# Patient Record
Sex: MALE | Race: WHITE | Employment: FULL TIME | URBAN - METROPOLITAN AREA
[De-identification: names, ages, dates, MRNs, and addresses within clinical notes are randomized per-mention and may not be internally consistent; named-entity substitution may affect disease eponyms.]

---

## 2023-04-11 PROBLEM — L91.8 SKIN TAGS, MULTIPLE ACQUIRED: Status: ACTIVE | Noted: 2023-04-11

## 2023-04-11 PROBLEM — L30.9 ECZEMA: Status: ACTIVE | Noted: 2023-04-11

## 2023-04-11 PROBLEM — J98.09 TRACHEAL/BRONCHIAL DISEASE: Status: ACTIVE | Noted: 2023-04-11

## 2023-04-11 PROBLEM — M54.31 SCIATICA OF RIGHT SIDE: Status: ACTIVE | Noted: 2023-04-11

## 2023-04-11 PROBLEM — S90.129A CONTUSION, TOE: Status: ACTIVE | Noted: 2023-04-11

## 2023-04-11 PROBLEM — E78.2 MIXED HYPERLIPIDEMIA: Status: ACTIVE | Noted: 2023-04-11

## 2023-04-11 PROBLEM — L98.9 LESION OF SKIN OF NOSE: Status: ACTIVE | Noted: 2023-04-11

## 2023-04-11 PROBLEM — R93.89 ABNORMAL CT OF THE CHEST: Status: ACTIVE | Noted: 2023-04-11

## 2023-04-11 PROBLEM — J39.8 TRACHEAL/BRONCHIAL DISEASE: Status: ACTIVE | Noted: 2023-04-11

## 2023-04-11 PROBLEM — G62.9 PERIPHERAL NEUROPATHY: Status: ACTIVE | Noted: 2023-04-11

## 2023-04-11 PROBLEM — R93.1 AGATSTON CORONARY ARTERY CALCIUM SCORE LESS THAN 100: Status: ACTIVE | Noted: 2023-04-11

## 2023-04-11 PROBLEM — R74.8 ELEVATED ALKALINE PHOSPHATASE LEVEL: Status: ACTIVE | Noted: 2023-04-11

## 2023-04-11 PROBLEM — Z98.890 H/O AORTIC ARCH REPAIR: Status: ACTIVE | Noted: 2023-04-11

## 2023-04-11 PROBLEM — M54.17 LUMBOSACRAL NEURITIS: Status: ACTIVE | Noted: 2023-04-11

## 2023-04-11 PROBLEM — R35.0 URINARY FREQUENCY: Status: ACTIVE | Noted: 2023-04-11

## 2023-04-12 RX ORDER — MELOXICAM 15 MG/1
1 TABLET ORAL DAILY
COMMUNITY
Start: 2022-01-24 | End: 2023-04-13 | Stop reason: SDUPTHER

## 2023-04-12 RX ORDER — GLUCOSAMINE/CHONDROITIN/C/MANG 500-400 MG
CAPSULE ORAL
COMMUNITY
End: 2023-04-13 | Stop reason: WASHOUT

## 2023-04-12 RX ORDER — ASPIRIN 81 MG/1
1 TABLET ORAL DAILY
COMMUNITY

## 2023-04-12 RX ORDER — POLYETHYLENE GLYCOL 3350, SODIUM CHLORIDE, SODIUM BICARBONATE, POTASSIUM CHLORIDE 420; 11.2; 5.72; 1.48 G/4L; G/4L; G/4L; G/4L
POWDER, FOR SOLUTION ORAL
COMMUNITY
Start: 2023-01-10 | End: 2023-04-13 | Stop reason: WASHOUT

## 2023-04-12 RX ORDER — MULTIVIT-MIN/FA/LYCOPEN/LUTEIN .4-300-25
TABLET ORAL
COMMUNITY

## 2023-04-12 RX ORDER — AMOXICILLIN 500 MG/1
500 CAPSULE ORAL EVERY 8 HOURS
COMMUNITY
Start: 2023-01-10 | End: 2023-04-13 | Stop reason: WASHOUT

## 2023-04-12 RX ORDER — ATORVASTATIN CALCIUM 20 MG/1
1 TABLET, FILM COATED ORAL DAILY
COMMUNITY
Start: 2020-10-08 | End: 2023-04-13 | Stop reason: SDUPTHER

## 2023-04-12 RX ORDER — MULTIVITAMIN/IRON/FOLIC ACID 18MG-0.4MG
TABLET ORAL
COMMUNITY
Start: 2020-09-16

## 2023-04-12 RX ORDER — COLLAGENASE CLOSTRIDIUM HIST.
POWDER (EA) MISCELLANEOUS
COMMUNITY
Start: 2020-09-16

## 2023-04-12 RX ORDER — BETAMETHASONE DIPROPIONATE 0.5 MG/G
CREAM TOPICAL 2 TIMES DAILY
COMMUNITY
Start: 2020-09-16

## 2023-04-13 ENCOUNTER — OFFICE VISIT (OUTPATIENT)
Dept: PRIMARY CARE | Facility: CLINIC | Age: 58
End: 2023-04-13
Payer: COMMERCIAL

## 2023-04-13 VITALS
RESPIRATION RATE: 22 BRPM | WEIGHT: 240 LBS | BODY MASS INDEX: 31.66 KG/M2 | TEMPERATURE: 97.5 F | HEART RATE: 81 BPM | DIASTOLIC BLOOD PRESSURE: 77 MMHG | SYSTOLIC BLOOD PRESSURE: 116 MMHG

## 2023-04-13 DIAGNOSIS — R97.20 ELEVATED PSA: Primary | ICD-10-CM

## 2023-04-13 DIAGNOSIS — G60.9 IDIOPATHIC PERIPHERAL NEUROPATHY: ICD-10-CM

## 2023-04-13 DIAGNOSIS — R73.9 HYPERGLYCEMIA: ICD-10-CM

## 2023-04-13 DIAGNOSIS — L98.9 LESION OF SKIN OF NOSE: ICD-10-CM

## 2023-04-13 DIAGNOSIS — M54.17 LUMBOSACRAL NEURITIS: ICD-10-CM

## 2023-04-13 DIAGNOSIS — E78.2 MIXED HYPERLIPIDEMIA: ICD-10-CM

## 2023-04-13 PROBLEM — S90.129A CONTUSION, TOE: Status: RESOLVED | Noted: 2023-04-11 | Resolved: 2023-04-13

## 2023-04-13 PROCEDURE — 83036 HEMOGLOBIN GLYCOSYLATED A1C: CPT

## 2023-04-13 PROCEDURE — 84153 ASSAY OF PSA TOTAL: CPT

## 2023-04-13 PROCEDURE — 99214 OFFICE O/P EST MOD 30 MIN: CPT | Performed by: INTERNAL MEDICINE

## 2023-04-13 PROCEDURE — 36415 COLL VENOUS BLD VENIPUNCTURE: CPT | Performed by: INTERNAL MEDICINE

## 2023-04-13 PROCEDURE — 1036F TOBACCO NON-USER: CPT | Performed by: INTERNAL MEDICINE

## 2023-04-13 RX ORDER — ATORVASTATIN CALCIUM 20 MG/1
20 TABLET, FILM COATED ORAL DAILY
Qty: 90 TABLET | Refills: 1 | Status: SHIPPED | OUTPATIENT
Start: 2023-04-13 | End: 2023-10-09 | Stop reason: SDUPTHER

## 2023-04-13 RX ORDER — MELOXICAM 15 MG/1
15 TABLET ORAL DAILY
Qty: 90 TABLET | Refills: 1 | Status: SHIPPED | OUTPATIENT
Start: 2023-04-13 | End: 2023-10-09 | Stop reason: SDUPTHER

## 2023-04-13 ASSESSMENT — ENCOUNTER SYMPTOMS
COUGH: 0
NAUSEA: 0
CONSTIPATION: 0
FREQUENCY: 0
APPETITE CHANGE: 0
DYSURIA: 0
DIFFICULTY URINATING: 0
FEVER: 0
ANAL BLEEDING: 0
SHORTNESS OF BREATH: 0
ACTIVITY CHANGE: 0
WHEEZING: 0
CHILLS: 0
CHEST TIGHTNESS: 0
PALPITATIONS: 0
ABDOMINAL PAIN: 0
UNEXPECTED WEIGHT CHANGE: 0
VOMITING: 0
DIARRHEA: 0
FATIGUE: 0
NUMBNESS: 1

## 2023-04-13 NOTE — ASSESSMENT & PLAN NOTE
Saw derm and had biopsy-waiting on results  Concerned it is basal cell ca  Will likely have mohs if cancer noted  Follow up with derm

## 2023-04-13 NOTE — PROGRESS NOTES
Subjective   Patient ID: Kyler Cisneros is a 57 y.o. male who presents for routine 6 month follow up.    HPI    Pt here in follow up.  He saw dermatology (Dr. Valente) for his nose lesion.  They are waiting on pathology but he thinks it was basal cell cancer.  If this is positive he will need a Mohs procedure.      He had colonoscopy in 1/2023 that showed numerous polyps (10)-he will have to repeat in 3 years.  He also had diverticulosis.  He is on probiotic that has helped the diarrhea he was once having.      He continues to have numbness of of the bottom of his feet and coldness to the feet.      Review of Systems   Constitutional:  Negative for activity change, appetite change, chills, fatigue, fever and unexpected weight change.   Respiratory:  Negative for cough, chest tightness, shortness of breath and wheezing.    Cardiovascular:  Negative for chest pain, palpitations and leg swelling.   Gastrointestinal:  Negative for abdominal pain, anal bleeding, constipation, diarrhea, nausea and vomiting.   Genitourinary:  Negative for decreased urine volume, difficulty urinating, dysuria, frequency and urgency.   Neurological:  Positive for numbness (feet).       Objective   /77   Pulse 81   Temp 36.4 °C (97.5 °F)   Resp 22   Wt 109 kg (240 lb)   BMI 31.66 kg/m²      Physical Exam  Cardiovascular:      Rate and Rhythm: Normal rate and regular rhythm.      Pulses: Normal pulses.      Heart sounds: Normal heart sounds.   Pulmonary:      Breath sounds: Normal breath sounds.   Abdominal:      General: Bowel sounds are normal.   Musculoskeletal:         General: No swelling, tenderness, deformity or signs of injury. Normal range of motion.      Right lower leg: No edema.      Left lower leg: No edema.   Skin:     General: Skin is warm and dry.      Findings: No erythema.   Neurological:      Mental Status: He is alert.      Sensory: Sensory deficit (minor decrease in sensation ball of both feet) present.       Coordination: Coordination normal.      Deep Tendon Reflexes: Reflexes normal.   Psychiatric:         Mood and Affect: Mood normal.         Assessment/Plan   Problem List Items Addressed This Visit          Nervous    Lumbosacral neuritis    Relevant Medications    meloxicam (Mobic) 15 mg tablet    Peripheral neuropathy     Discussed symptoms pt having likely from back and nerve damage  Can do NCT/EMG he will think on this  Did have smoking history so will consider PAD but pulses good on exam  Will check a1c today but sugars have been ok in past  Monitor for now         Relevant Orders    Follow Up In Primary Care       Genitourinary    Elevated PSA - Primary     Was just over normal limit in 10/2022  Will repeat it today  No urinary symptoms at this time          Relevant Orders    Prostate Specific Antigen       Other    Lesion of skin of nose     Saw derm and had biopsy-waiting on results  Concerned it is basal cell ca  Will likely have mohs if cancer noted  Follow up with derm          Mixed hyperlipidemia     On statin therapy  Lipids checked previous and LDL <100         Relevant Medications    atorvastatin (Lipitor) 20 mg tablet    Other Relevant Orders    Follow Up In Primary Care     Other Visit Diagnoses       Hyperglycemia        Relevant Orders    Hemoglobin A1C

## 2023-04-13 NOTE — PATIENT INSTRUCTIONS
Continue medications as directed-refill provided  We did repeat blood work today and will call with results  The numbness of the feet/cold is likely from nerve damage from low back; we can do NCT/EMG in future if you want  Continue to adhere to healthy diet and exercise regularly  Follow up in 6 month for full physical

## 2023-04-13 NOTE — ASSESSMENT & PLAN NOTE
Discussed symptoms pt having likely from back and nerve damage  Can do NCT/EMG he will think on this  Did have smoking history so will consider PAD but pulses good on exam  Will check a1c today but sugars have been ok in past  Monitor for now

## 2023-04-14 ENCOUNTER — PATIENT MESSAGE (OUTPATIENT)
Dept: PRIMARY CARE | Facility: CLINIC | Age: 58
End: 2023-04-14
Payer: COMMERCIAL

## 2023-04-14 DIAGNOSIS — R97.20 ELEVATED PSA: Primary | ICD-10-CM

## 2023-04-14 LAB
ESTIMATED AVERAGE GLUCOSE FOR HBA1C: 140 MG/DL
HEMOGLOBIN A1C/HEMOGLOBIN TOTAL IN BLOOD: 6.5 %
PROSTATE SPECIFIC AG (NG/ML) IN SER/PLAS: 5.05 NG/ML (ref 0–4)

## 2023-09-13 PROBLEM — S90.129A CONTUSION, TOE: Status: ACTIVE | Noted: 2023-09-13

## 2023-09-13 PROBLEM — R19.5 LOOSE STOOLS: Status: ACTIVE | Noted: 2023-09-13

## 2023-09-13 PROBLEM — R14.0 GASSINESS: Status: ACTIVE | Noted: 2023-09-13

## 2023-09-13 PROBLEM — F17.210 CIGARETTE NICOTINE DEPENDENCE: Status: ACTIVE | Noted: 2023-09-13

## 2023-09-13 RX ORDER — POLYETHYLENE GLYCOL 3350, SODIUM CHLORIDE, SODIUM BICARBONATE, POTASSIUM CHLORIDE 420; 11.2; 5.72; 1.48 G/4L; G/4L; G/4L; G/4L
POWDER, FOR SOLUTION ORAL
COMMUNITY
Start: 2023-01-10 | End: 2023-10-09 | Stop reason: WASHOUT

## 2023-09-13 RX ORDER — ATORVASTATIN CALCIUM 20 MG/1
20 TABLET, FILM COATED ORAL DAILY
COMMUNITY
Start: 2020-10-08 | End: 2023-10-09 | Stop reason: WASHOUT

## 2023-09-13 RX ORDER — TRIAMCINOLONE ACETONIDE 1 MG/G
CREAM TOPICAL
COMMUNITY
Start: 2023-08-09

## 2023-09-13 RX ORDER — BETAMETHASONE DIPROPIONATE 0.5 MG/G
1 CREAM TOPICAL 2 TIMES DAILY
COMMUNITY
Start: 2020-09-16 | End: 2023-10-09 | Stop reason: WASHOUT

## 2023-10-09 ENCOUNTER — OFFICE VISIT (OUTPATIENT)
Dept: PRIMARY CARE | Facility: CLINIC | Age: 58
End: 2023-10-09
Payer: COMMERCIAL

## 2023-10-09 VITALS
HEART RATE: 80 BPM | SYSTOLIC BLOOD PRESSURE: 116 MMHG | BODY MASS INDEX: 28.88 KG/M2 | HEIGHT: 73 IN | RESPIRATION RATE: 18 BRPM | WEIGHT: 217.9 LBS | DIASTOLIC BLOOD PRESSURE: 72 MMHG

## 2023-10-09 DIAGNOSIS — R97.20 ELEVATED PSA: ICD-10-CM

## 2023-10-09 DIAGNOSIS — Z13.29 SCREENING FOR THYROID DISORDER: ICD-10-CM

## 2023-10-09 DIAGNOSIS — E78.2 MIXED HYPERLIPIDEMIA: ICD-10-CM

## 2023-10-09 DIAGNOSIS — G60.9 IDIOPATHIC PERIPHERAL NEUROPATHY: ICD-10-CM

## 2023-10-09 DIAGNOSIS — E11.9 CONTROLLED TYPE 2 DIABETES MELLITUS WITHOUT COMPLICATION, WITHOUT LONG-TERM CURRENT USE OF INSULIN (MULTI): ICD-10-CM

## 2023-10-09 DIAGNOSIS — Z23 NEED FOR INFLUENZA VACCINATION: ICD-10-CM

## 2023-10-09 DIAGNOSIS — L30.9 ECZEMA, UNSPECIFIED TYPE: ICD-10-CM

## 2023-10-09 DIAGNOSIS — Z23 NEED FOR SHINGLES VACCINE: ICD-10-CM

## 2023-10-09 DIAGNOSIS — M54.17 LUMBOSACRAL NEURITIS: ICD-10-CM

## 2023-10-09 DIAGNOSIS — Z00.00 ROUTINE GENERAL MEDICAL EXAMINATION AT A HEALTH CARE FACILITY: Primary | ICD-10-CM

## 2023-10-09 PROBLEM — R14.0 GASSINESS: Status: RESOLVED | Noted: 2023-09-13 | Resolved: 2023-10-09

## 2023-10-09 PROBLEM — J98.09 TRACHEAL/BRONCHIAL DISEASE: Status: RESOLVED | Noted: 2023-04-11 | Resolved: 2023-10-09

## 2023-10-09 PROBLEM — L98.9 LESION OF SKIN OF NOSE: Status: RESOLVED | Noted: 2023-04-11 | Resolved: 2023-10-09

## 2023-10-09 PROBLEM — R35.0 URINARY FREQUENCY: Status: RESOLVED | Noted: 2023-04-11 | Resolved: 2023-10-09

## 2023-10-09 PROBLEM — S90.129A CONTUSION, TOE: Status: RESOLVED | Noted: 2023-09-13 | Resolved: 2023-10-09

## 2023-10-09 PROBLEM — J39.8 TRACHEAL/BRONCHIAL DISEASE: Status: RESOLVED | Noted: 2023-04-11 | Resolved: 2023-10-09

## 2023-10-09 PROBLEM — F17.210 CIGARETTE NICOTINE DEPENDENCE: Status: RESOLVED | Noted: 2023-09-13 | Resolved: 2023-10-09

## 2023-10-09 PROBLEM — R19.5 LOOSE STOOLS: Status: RESOLVED | Noted: 2023-09-13 | Resolved: 2023-10-09

## 2023-10-09 PROCEDURE — 84443 ASSAY THYROID STIM HORMONE: CPT

## 2023-10-09 PROCEDURE — 85027 COMPLETE CBC AUTOMATED: CPT

## 2023-10-09 PROCEDURE — 83036 HEMOGLOBIN GLYCOSYLATED A1C: CPT

## 2023-10-09 PROCEDURE — 3061F NEG MICROALBUMINURIA REV: CPT | Performed by: INTERNAL MEDICINE

## 2023-10-09 PROCEDURE — 3074F SYST BP LT 130 MM HG: CPT | Performed by: INTERNAL MEDICINE

## 2023-10-09 PROCEDURE — 36415 COLL VENOUS BLD VENIPUNCTURE: CPT

## 2023-10-09 PROCEDURE — 3044F HG A1C LEVEL LT 7.0%: CPT | Performed by: INTERNAL MEDICINE

## 2023-10-09 PROCEDURE — 3048F LDL-C <100 MG/DL: CPT | Performed by: INTERNAL MEDICINE

## 2023-10-09 PROCEDURE — 1036F TOBACCO NON-USER: CPT | Performed by: INTERNAL MEDICINE

## 2023-10-09 PROCEDURE — 90471 IMMUNIZATION ADMIN: CPT | Performed by: INTERNAL MEDICINE

## 2023-10-09 PROCEDURE — 3078F DIAST BP <80 MM HG: CPT | Performed by: INTERNAL MEDICINE

## 2023-10-09 PROCEDURE — 81001 URINALYSIS AUTO W/SCOPE: CPT

## 2023-10-09 PROCEDURE — 90750 HZV VACC RECOMBINANT IM: CPT | Performed by: INTERNAL MEDICINE

## 2023-10-09 PROCEDURE — 82043 UR ALBUMIN QUANTITATIVE: CPT

## 2023-10-09 PROCEDURE — 90472 IMMUNIZATION ADMIN EACH ADD: CPT | Performed by: INTERNAL MEDICINE

## 2023-10-09 PROCEDURE — 90686 IIV4 VACC NO PRSV 0.5 ML IM: CPT | Performed by: INTERNAL MEDICINE

## 2023-10-09 PROCEDURE — 99396 PREV VISIT EST AGE 40-64: CPT | Performed by: INTERNAL MEDICINE

## 2023-10-09 PROCEDURE — 80061 LIPID PANEL: CPT

## 2023-10-09 PROCEDURE — 80053 COMPREHEN METABOLIC PANEL: CPT

## 2023-10-09 PROCEDURE — 82570 ASSAY OF URINE CREATININE: CPT

## 2023-10-09 RX ORDER — MELOXICAM 15 MG/1
15 TABLET ORAL DAILY
Qty: 90 TABLET | Refills: 3 | Status: SHIPPED | OUTPATIENT
Start: 2023-10-09

## 2023-10-09 RX ORDER — ATORVASTATIN CALCIUM 20 MG/1
20 TABLET, FILM COATED ORAL DAILY
Qty: 90 TABLET | Refills: 3 | Status: SHIPPED | OUTPATIENT
Start: 2023-10-09

## 2023-10-09 ASSESSMENT — ENCOUNTER SYMPTOMS
TROUBLE SWALLOWING: 0
TREMORS: 0
NECK PAIN: 0
BLOOD IN STOOL: 0
ADENOPATHY: 0
VOICE CHANGE: 0
VOMITING: 0
WHEEZING: 0
DYSPHORIC MOOD: 0
CHILLS: 0
RECTAL PAIN: 0
LIGHT-HEADEDNESS: 0
FACIAL SWELLING: 0
ABDOMINAL PAIN: 0
COUGH: 0
COLOR CHANGE: 0
ARTHRALGIAS: 0
CHOKING: 0
UNEXPECTED WEIGHT CHANGE: 0
HEMATURIA: 0
DIARRHEA: 0
EYE REDNESS: 0
APPETITE CHANGE: 0
SPEECH DIFFICULTY: 0
ABDOMINAL DISTENTION: 0
BRUISES/BLEEDS EASILY: 0
SHORTNESS OF BREATH: 0
FACIAL ASYMMETRY: 0
NECK STIFFNESS: 0
SINUS PAIN: 0
FATIGUE: 0
BACK PAIN: 0
HEADACHES: 0
STRIDOR: 0
DIAPHORESIS: 0
FEVER: 0
POLYDIPSIA: 0
CONFUSION: 0
PALPITATIONS: 0
PHOTOPHOBIA: 0
ANAL BLEEDING: 0
SINUS PRESSURE: 0
DIFFICULTY URINATING: 0
WOUND: 0
FREQUENCY: 0
EYE DISCHARGE: 0
DIZZINESS: 0
DYSURIA: 0
EYE ITCHING: 0
ACTIVITY CHANGE: 0
SLEEP DISTURBANCE: 0
FLANK PAIN: 0
SEIZURES: 0
CONSTIPATION: 0
NUMBNESS: 0
MYALGIAS: 0
SORE THROAT: 0
NERVOUS/ANXIOUS: 0
RHINORRHEA: 0
JOINT SWELLING: 0
DECREASED CONCENTRATION: 0
CHEST TIGHTNESS: 0
WEAKNESS: 0
POLYPHAGIA: 0
EYE PAIN: 0
NAUSEA: 0
APNEA: 0

## 2023-10-09 NOTE — ASSESSMENT & PLAN NOTE
Pt was newly diagnosed at last visit in April  He worked to lose over 20 lbs  Will repeat levels today  Continue low carb/sugar diet

## 2023-10-09 NOTE — PATIENT INSTRUCTIONS
We did blood work and urine today and will call with any abnormal results  You got your flu shot today and 1st shingles vaccine (may note flu like symptoms/sore arm for next few days)  Will need repeat Shingles 2nd dose in 2-6 months (make appt now)  Continue healthy diet and exercise as you are doing-congrats on weight loss already achieved  Follow up with urology as directed for elevated PSA noted  Hold off on RSV vaccine for this year-would like to see this winter season first  Follow up in 1 year

## 2023-10-09 NOTE — PROGRESS NOTES
Subjective   Patient ID: Kyler Cisneros is a 58 y.o. male who presents for Annual Exam.    HPI    Pt here for full physical.  He has been busy taking care of his mother who recently broke both hips.  He is down over 20 lbs since last visit.  He cut out carbs and fast food.  He did this due to hemoglobin A1c was noted at 6.5% in 4/2023.       He has been seeing urology due to elevated PSA.  He had MRI that did show lesion.  They just recommend repeat PSA and visit in 1 year.      He had colonoscopy in 1/2023 and did have multiple polyps.  He will repeat in 3 years.  He takes probiotic which helps with bowel movements.      Review of Systems   Constitutional:  Negative for activity change, appetite change, chills, diaphoresis, fatigue, fever and unexpected weight change.   HENT:  Negative for congestion, dental problem, drooling, ear discharge, ear pain, facial swelling, hearing loss, mouth sores, nosebleeds, postnasal drip, rhinorrhea, sinus pressure, sinus pain, sneezing, sore throat, tinnitus, trouble swallowing and voice change.    Eyes:  Negative for photophobia, pain, discharge, redness, itching and visual disturbance.   Respiratory:  Negative for apnea, cough, choking, chest tightness, shortness of breath, wheezing and stridor.    Cardiovascular:  Negative for chest pain, palpitations and leg swelling.   Gastrointestinal:  Negative for abdominal distention, abdominal pain, anal bleeding, blood in stool, constipation, diarrhea, nausea, rectal pain and vomiting.   Endocrine: Negative for cold intolerance, heat intolerance, polydipsia, polyphagia and polyuria.   Genitourinary:  Negative for decreased urine volume, difficulty urinating, dysuria, enuresis, flank pain, frequency, genital sores, hematuria, penile discharge, penile pain, penile swelling, scrotal swelling, testicular pain and urgency.   Musculoskeletal:  Negative for arthralgias, back pain, gait problem, joint swelling, myalgias, neck pain and neck  "stiffness.   Skin:  Negative for color change, pallor, rash and wound.   Allergic/Immunologic: Negative for environmental allergies, food allergies and immunocompromised state.   Neurological:  Negative for dizziness, tremors, seizures, syncope, facial asymmetry, speech difficulty, weakness, light-headedness, numbness and headaches.   Hematological:  Negative for adenopathy. Does not bruise/bleed easily.   Psychiatric/Behavioral:  Negative for confusion, decreased concentration, dysphoric mood and sleep disturbance. The patient is not nervous/anxious.        Objective   /72 (BP Location: Left arm, Patient Position: Sitting)   Pulse 80   Resp 18   Ht 1.842 m (6' 0.5\")   Wt 98.8 kg (217 lb 14.4 oz)   BMI 29.15 kg/m²    Physical Exam  Constitutional:       Appearance: Normal appearance.   HENT:      Head: Normocephalic and atraumatic.      Right Ear: Tympanic membrane, ear canal and external ear normal. There is no impacted cerumen.      Left Ear: Tympanic membrane, ear canal and external ear normal. There is no impacted cerumen.      Nose: Nose normal. No congestion or rhinorrhea.      Mouth/Throat:      Mouth: Mucous membranes are moist.      Pharynx: Oropharynx is clear. No oropharyngeal exudate or posterior oropharyngeal erythema.   Eyes:      Extraocular Movements: Extraocular movements intact.      Conjunctiva/sclera: Conjunctivae normal.      Pupils: Pupils are equal, round, and reactive to light.   Neck:      Vascular: No carotid bruit.   Cardiovascular:      Rate and Rhythm: Normal rate and regular rhythm.      Pulses: Normal pulses.      Heart sounds: Normal heart sounds. No murmur heard.  Pulmonary:      Effort: Pulmonary effort is normal. No respiratory distress.      Breath sounds: Normal breath sounds. No wheezing, rhonchi or rales.   Abdominal:      General: Abdomen is flat. Bowel sounds are normal. There is no distension.      Palpations: Abdomen is soft.      Tenderness: There is no " abdominal tenderness.      Hernia: No hernia is present.   Musculoskeletal:         General: No swelling or tenderness. Normal range of motion.      Cervical back: Normal range of motion and neck supple.      Right lower leg: No edema.      Left lower leg: No edema.   Lymphadenopathy:      Cervical: No cervical adenopathy.   Skin:     General: Skin is warm and dry.      Findings: No lesion or rash.   Neurological:      General: No focal deficit present.      Mental Status: He is alert and oriented to person, place, and time.      Cranial Nerves: No cranial nerve deficit.      Sensory: No sensory deficit.      Motor: No weakness.   Psychiatric:         Mood and Affect: Mood normal.         Behavior: Behavior normal.         Thought Content: Thought content normal.         Judgment: Judgment normal.         Assessment/Plan   Problem List Items Addressed This Visit       Eczema    Lumbosacral neuritis     Uses meloxicam to manage pain; refill provided           Relevant Medications    meloxicam (Mobic) 15 mg tablet    Mixed hyperlipidemia     On statin therapy-refill provided  Repeat labs  Did lose weight         Relevant Medications    atorvastatin (Lipitor) 20 mg tablet    Other Relevant Orders    Lipid Panel    Peripheral neuropathy    Elevated PSA     Seeing urology yearly to follow this           Controlled type 2 diabetes mellitus without complication, without long-term current use of insulin (CMS/[facility])     Pt was newly diagnosed at last visit in April  He worked to lose over 20 lbs  Will repeat levels today  Continue low carb/sugar diet         Relevant Orders    Hemoglobin A1C    Urinalysis with Reflex Microscopic    Albumin , Urine Random     Other Visit Diagnoses       Routine general medical examination at a health care facility    -  Primary    Relevant Orders    Comprehensive Metabolic Panel    CBC    Urinalysis with Reflex Microscopic    Follow Up In Primary Care - Health Maintenance    Screening for  thyroid disorder        Relevant Orders    TSH    Need for influenza vaccination        Relevant Orders    Flu vaccine (IIV4) age 6 months and greater, preservative free (Completed)    Need for shingles vaccine        Relevant Orders    Zoster vaccine, recombinant, adult (SHINGRIX) (Completed)

## 2023-10-10 LAB
ALBUMIN SERPL BCP-MCNC: 4.4 G/DL (ref 3.4–5)
ALP SERPL-CCNC: 100 U/L (ref 33–136)
ALT SERPL W P-5'-P-CCNC: 27 U/L (ref 10–52)
ANION GAP SERPL CALC-SCNC: 12 MMOL/L (ref 10–20)
APPEARANCE UR: CLEAR
AST SERPL W P-5'-P-CCNC: 25 U/L (ref 9–39)
BILIRUB SERPL-MCNC: 1.1 MG/DL (ref 0–1.2)
BILIRUB UR STRIP.AUTO-MCNC: NEGATIVE MG/DL
BUN SERPL-MCNC: 17 MG/DL (ref 6–23)
CALCIUM SERPL-MCNC: 9.3 MG/DL (ref 8.6–10.6)
CHLORIDE SERPL-SCNC: 107 MMOL/L (ref 98–107)
CHOLEST SERPL-MCNC: 145 MG/DL (ref 0–199)
CHOLESTEROL/HDL RATIO: 3.4
CO2 SERPL-SCNC: 26 MMOL/L (ref 21–32)
COLOR UR: YELLOW
CREAT SERPL-MCNC: 0.76 MG/DL (ref 0.5–1.3)
CREAT UR-MCNC: 76.1 MG/DL (ref 20–370)
ERYTHROCYTE [DISTWIDTH] IN BLOOD BY AUTOMATED COUNT: 12.5 % (ref 11.5–14.5)
EST. AVERAGE GLUCOSE BLD GHB EST-MCNC: 108 MG/DL
GFR SERPL CREATININE-BSD FRML MDRD: >90 ML/MIN/1.73M*2
GLUCOSE SERPL-MCNC: 92 MG/DL (ref 74–99)
GLUCOSE UR STRIP.AUTO-MCNC: NEGATIVE MG/DL
HBA1C MFR BLD: 5.4 %
HCT VFR BLD AUTO: 47.1 % (ref 41–52)
HDLC SERPL-MCNC: 42.7 MG/DL
HGB BLD-MCNC: 15.3 G/DL (ref 13.5–17.5)
KETONES UR STRIP.AUTO-MCNC: NEGATIVE MG/DL
LDLC SERPL CALC-MCNC: 89 MG/DL (ref 140–190)
LEUKOCYTE ESTERASE UR QL STRIP.AUTO: ABNORMAL
MCH RBC QN AUTO: 29.2 PG (ref 26–34)
MCHC RBC AUTO-ENTMCNC: 32.5 G/DL (ref 32–36)
MCV RBC AUTO: 90 FL (ref 80–100)
MICROALBUMIN UR-MCNC: 18.2 MG/L
MICROALBUMIN/CREAT UR: 23.9 UG/MG CREAT
NITRITE UR QL STRIP.AUTO: NEGATIVE
NON HDL CHOLESTEROL: 102 MG/DL (ref 0–149)
NRBC BLD-RTO: 0 /100 WBCS (ref 0–0)
PH UR STRIP.AUTO: 5 [PH]
PLATELET # BLD AUTO: 222 X10*3/UL (ref 150–450)
PMV BLD AUTO: 10.7 FL (ref 7.5–11.5)
POTASSIUM SERPL-SCNC: 4.2 MMOL/L (ref 3.5–5.3)
PROT SERPL-MCNC: 7.5 G/DL (ref 6.4–8.2)
PROT UR STRIP.AUTO-MCNC: NEGATIVE MG/DL
RBC # BLD AUTO: 5.24 X10*6/UL (ref 4.5–5.9)
RBC # UR STRIP.AUTO: NEGATIVE /UL
RBC #/AREA URNS AUTO: ABNORMAL /HPF
SODIUM SERPL-SCNC: 141 MMOL/L (ref 136–145)
SP GR UR STRIP.AUTO: 1.01
TRIGL SERPL-MCNC: 69 MG/DL (ref 0–149)
TSH SERPL-ACNC: 0.97 MIU/L (ref 0.44–3.98)
UROBILINOGEN UR STRIP.AUTO-MCNC: <2 MG/DL
VLDL: 14 MG/DL (ref 0–40)
WBC # BLD AUTO: 7 X10*3/UL (ref 4.4–11.3)
WBC #/AREA URNS AUTO: ABNORMAL /HPF

## 2023-11-16 PROBLEM — L98.9 LESION OF SKIN OF NOSE: Status: ACTIVE | Noted: 2023-11-16

## 2023-12-18 ENCOUNTER — CLINICAL SUPPORT (OUTPATIENT)
Dept: PRIMARY CARE | Facility: CLINIC | Age: 58
End: 2023-12-18
Payer: COMMERCIAL

## 2023-12-18 DIAGNOSIS — Z23 NEED FOR SHINGLES VACCINE: ICD-10-CM

## 2023-12-18 PROCEDURE — 90750 HZV VACC RECOMBINANT IM: CPT | Performed by: INTERNAL MEDICINE

## 2023-12-18 PROCEDURE — 90471 IMMUNIZATION ADMIN: CPT | Performed by: INTERNAL MEDICINE

## 2024-08-22 ENCOUNTER — LAB (OUTPATIENT)
Dept: LAB | Facility: LAB | Age: 59
End: 2024-08-22
Payer: COMMERCIAL

## 2024-08-22 DIAGNOSIS — R97.20 ELEVATED PSA: ICD-10-CM

## 2024-08-22 PROCEDURE — 36415 COLL VENOUS BLD VENIPUNCTURE: CPT

## 2024-08-22 PROCEDURE — 84153 ASSAY OF PSA TOTAL: CPT

## 2024-08-23 ENCOUNTER — OFFICE VISIT (OUTPATIENT)
Dept: UROLOGY | Facility: CLINIC | Age: 59
End: 2024-08-23
Payer: COMMERCIAL

## 2024-08-23 VITALS
HEART RATE: 79 BPM | WEIGHT: 237 LBS | SYSTOLIC BLOOD PRESSURE: 134 MMHG | BODY MASS INDEX: 31.7 KG/M2 | DIASTOLIC BLOOD PRESSURE: 81 MMHG | TEMPERATURE: 97.9 F

## 2024-08-23 DIAGNOSIS — R97.20 ELEVATED PSA: ICD-10-CM

## 2024-08-23 LAB — PSA SERPL-MCNC: 3.52 NG/ML

## 2024-08-23 PROCEDURE — 99213 OFFICE O/P EST LOW 20 MIN: CPT | Performed by: STUDENT IN AN ORGANIZED HEALTH CARE EDUCATION/TRAINING PROGRAM

## 2024-08-23 PROCEDURE — 3075F SYST BP GE 130 - 139MM HG: CPT | Performed by: STUDENT IN AN ORGANIZED HEALTH CARE EDUCATION/TRAINING PROGRAM

## 2024-08-23 PROCEDURE — 3079F DIAST BP 80-89 MM HG: CPT | Performed by: STUDENT IN AN ORGANIZED HEALTH CARE EDUCATION/TRAINING PROGRAM

## 2024-08-23 NOTE — PROGRESS NOTES
"Subjective   Patient ID: Grabiel Cisneros \"Anne" is a 59 y.o. male who presents for   Chief Complaint   Patient presents with    Follow-up     HPI  Grabiel Cisneros \"Anne" is a 59 y.o. male with elevated PSA here for 1 year follow-up.    Prostate MRI 7/2023 which showed a PIRAD 2 lesion, a 59g gland and PSA density of 0.08.     The patient reports frequent urination but this is not bothersome.    PSA trends:  August 2024- 3.52  April 2023- 5.05  Oct 2022- 4.15    Review of Systems  All systems were reviewed. Anything negative was noted in the HPI.    Objective   Physical Exam  Vitals reviewed.   HENT:      Right Ear: Tympanic membrane and ear canal normal.      Left Ear: Tympanic membrane and ear canal normal.      Nose: Nose normal.      Mouth/Throat:      Pharynx: Oropharynx is clear.   Eyes:      Pupils: Pupils are equal, round, and reactive to light.   Cardiovascular:      Rate and Rhythm: Normal rate and regular rhythm.      Heart sounds: Normal heart sounds.   Pulmonary:      Effort: Pulmonary effort is normal.      Breath sounds: Normal breath sounds.   Abdominal:      General: Abdomen is flat. Bowel sounds are normal.      Palpations: Abdomen is soft.   Musculoskeletal:         General: Normal range of motion.      Cervical back: Normal range of motion and neck supple.   Skin:     General: Skin is warm and dry.   Neurological:      General: No focal deficit present.      Mental Status: He is alert.   Psychiatric:         Mood and Affect: Mood normal.         Past Medical History:   Diagnosis Date    Cigarette nicotine dependence 09/13/2023    Elevated prostate specific antigen (PSA) 07/09/2018    Rising PSA level    Lesion of skin of nose 04/11/2023         Past Surgical History:   Procedure Laterality Date    BACK SURGERY  01/26/2021    Back Surgery    OTHER SURGICAL HISTORY Left 01/26/2021    Toe fracture repair    SKIN CANCER EXCISION      nose     Assessment/Plan   Problem List Items Addressed This Visit       " Elevated PSA    Relevant Orders    Prostate Specific Antigen (Completed)     Plan:  1 year FUV with repeat PSA        Scribe Attestation  By signing my name below, I, Dimitry Kaiser   attest that this documentation has been prepared under the direction and in the presence of Maverick Muñoz MD MPH.

## 2024-08-23 NOTE — ASSESSMENT & PLAN NOTE
This is a 59 year-old man referred for an elevated PSA. He had a prostate MRI in 2023 that was not concerning. We spoke at length regarding what PSA is, how it is used as a screening measure for prostate cancer. We went into detail that PSA is only a screening test as there are other causes of elevated values such as BPH. His PSA has been trending down so we will continue with surveillance.    PSA trends:  August 2024- 3.52  April 2023- 5.05  Oct 2022- 4.15

## 2024-10-02 DIAGNOSIS — M54.17 LUMBOSACRAL NEURITIS: ICD-10-CM

## 2024-10-02 RX ORDER — MELOXICAM 15 MG/1
15 TABLET ORAL DAILY
Qty: 90 TABLET | Refills: 3 | Status: SHIPPED | OUTPATIENT
Start: 2024-10-02

## 2024-10-11 ENCOUNTER — APPOINTMENT (OUTPATIENT)
Dept: PRIMARY CARE | Facility: CLINIC | Age: 59
End: 2024-10-11
Payer: COMMERCIAL

## 2024-10-16 ENCOUNTER — LAB (OUTPATIENT)
Dept: LAB | Facility: LAB | Age: 59
End: 2024-10-16

## 2024-10-16 ENCOUNTER — APPOINTMENT (OUTPATIENT)
Dept: PRIMARY CARE | Facility: CLINIC | Age: 59
End: 2024-10-16
Payer: COMMERCIAL

## 2024-10-16 VITALS
SYSTOLIC BLOOD PRESSURE: 122 MMHG | TEMPERATURE: 98.4 F | RESPIRATION RATE: 16 BRPM | WEIGHT: 225.6 LBS | HEIGHT: 72 IN | OXYGEN SATURATION: 98 % | HEART RATE: 70 BPM | BODY MASS INDEX: 30.56 KG/M2 | DIASTOLIC BLOOD PRESSURE: 70 MMHG

## 2024-10-16 DIAGNOSIS — Z13.29 SCREENING FOR THYROID DISORDER: ICD-10-CM

## 2024-10-16 DIAGNOSIS — E11.9 CONTROLLED TYPE 2 DIABETES MELLITUS WITHOUT COMPLICATION, WITHOUT LONG-TERM CURRENT USE OF INSULIN (MULTI): ICD-10-CM

## 2024-10-16 DIAGNOSIS — G62.9 PERIPHERAL POLYNEUROPATHY: ICD-10-CM

## 2024-10-16 DIAGNOSIS — R97.20 ELEVATED PSA: ICD-10-CM

## 2024-10-16 DIAGNOSIS — Z87.891 FORMER TOBACCO USE: ICD-10-CM

## 2024-10-16 DIAGNOSIS — Z23 NEED FOR INFLUENZA VACCINATION: ICD-10-CM

## 2024-10-16 DIAGNOSIS — E78.2 MIXED HYPERLIPIDEMIA: ICD-10-CM

## 2024-10-16 DIAGNOSIS — J43.9 PULMONARY EMPHYSEMA, UNSPECIFIED EMPHYSEMA TYPE (MULTI): ICD-10-CM

## 2024-10-16 DIAGNOSIS — E66.811 CLASS 1 OBESITY DUE TO EXCESS CALORIES WITH SERIOUS COMORBIDITY AND BODY MASS INDEX (BMI) OF 30.0 TO 30.9 IN ADULT: ICD-10-CM

## 2024-10-16 DIAGNOSIS — Z00.00 ROUTINE GENERAL MEDICAL EXAMINATION AT A HEALTH CARE FACILITY: ICD-10-CM

## 2024-10-16 DIAGNOSIS — E66.09 CLASS 1 OBESITY DUE TO EXCESS CALORIES WITH SERIOUS COMORBIDITY AND BODY MASS INDEX (BMI) OF 30.0 TO 30.9 IN ADULT: ICD-10-CM

## 2024-10-16 DIAGNOSIS — M54.17 LUMBOSACRAL NEURITIS: ICD-10-CM

## 2024-10-16 DIAGNOSIS — Z00.00 ROUTINE GENERAL MEDICAL EXAMINATION AT A HEALTH CARE FACILITY: Primary | ICD-10-CM

## 2024-10-16 LAB
ALBUMIN SERPL BCP-MCNC: 4.3 G/DL (ref 3.4–5)
ALP SERPL-CCNC: 114 U/L (ref 33–136)
ALT SERPL W P-5'-P-CCNC: 31 U/L (ref 10–52)
ANION GAP SERPL CALC-SCNC: 12 MMOL/L (ref 10–20)
APPEARANCE UR: CLEAR
AST SERPL W P-5'-P-CCNC: 31 U/L (ref 9–39)
BILIRUB SERPL-MCNC: 1.1 MG/DL (ref 0–1.2)
BILIRUB UR STRIP.AUTO-MCNC: NEGATIVE MG/DL
BUN SERPL-MCNC: 16 MG/DL (ref 6–23)
CALCIUM SERPL-MCNC: 9.5 MG/DL (ref 8.6–10.6)
CHLORIDE SERPL-SCNC: 104 MMOL/L (ref 98–107)
CHOLEST SERPL-MCNC: 156 MG/DL (ref 0–199)
CHOLESTEROL/HDL RATIO: 3.8
CO2 SERPL-SCNC: 30 MMOL/L (ref 21–32)
COLOR UR: ABNORMAL
CREAT SERPL-MCNC: 0.78 MG/DL (ref 0.5–1.3)
EGFRCR SERPLBLD CKD-EPI 2021: >90 ML/MIN/1.73M*2
ERYTHROCYTE [DISTWIDTH] IN BLOOD BY AUTOMATED COUNT: 11.9 % (ref 11.5–14.5)
EST. AVERAGE GLUCOSE BLD GHB EST-MCNC: 100 MG/DL
GLUCOSE SERPL-MCNC: 104 MG/DL (ref 74–99)
GLUCOSE UR STRIP.AUTO-MCNC: NORMAL MG/DL
HBA1C MFR BLD: 5.1 %
HCT VFR BLD AUTO: 46.7 % (ref 41–52)
HDLC SERPL-MCNC: 41.2 MG/DL
HGB BLD-MCNC: 15.4 G/DL (ref 13.5–17.5)
KETONES UR STRIP.AUTO-MCNC: NEGATIVE MG/DL
LDLC SERPL CALC-MCNC: 95 MG/DL
LEUKOCYTE ESTERASE UR QL STRIP.AUTO: ABNORMAL
MCH RBC QN AUTO: 28.9 PG (ref 26–34)
MCHC RBC AUTO-ENTMCNC: 33 G/DL (ref 32–36)
MCV RBC AUTO: 88 FL (ref 80–100)
MUCOUS THREADS #/AREA URNS AUTO: ABNORMAL /LPF
NITRITE UR QL STRIP.AUTO: NEGATIVE
NON HDL CHOLESTEROL: 115 MG/DL (ref 0–149)
NRBC BLD-RTO: 0 /100 WBCS (ref 0–0)
PH UR STRIP.AUTO: 5.5 [PH]
PLATELET # BLD AUTO: 279 X10*3/UL (ref 150–450)
POTASSIUM SERPL-SCNC: 4.4 MMOL/L (ref 3.5–5.3)
PROT SERPL-MCNC: 7.7 G/DL (ref 6.4–8.2)
PROT UR STRIP.AUTO-MCNC: NEGATIVE MG/DL
RBC # BLD AUTO: 5.33 X10*6/UL (ref 4.5–5.9)
RBC # UR STRIP.AUTO: NEGATIVE /UL
RBC #/AREA URNS AUTO: ABNORMAL /HPF
SODIUM SERPL-SCNC: 142 MMOL/L (ref 136–145)
SP GR UR STRIP.AUTO: 1.02
TRIGL SERPL-MCNC: 100 MG/DL (ref 0–149)
TSH SERPL-ACNC: 1.32 MIU/L (ref 0.44–3.98)
UROBILINOGEN UR STRIP.AUTO-MCNC: NORMAL MG/DL
VIT B12 SERPL-MCNC: 864 PG/ML (ref 211–911)
VLDL: 20 MG/DL (ref 0–40)
WBC # BLD AUTO: 7.8 X10*3/UL (ref 4.4–11.3)
WBC #/AREA URNS AUTO: ABNORMAL /HPF

## 2024-10-16 PROCEDURE — 80061 LIPID PANEL: CPT

## 2024-10-16 PROCEDURE — 90656 IIV3 VACC NO PRSV 0.5 ML IM: CPT | Performed by: INTERNAL MEDICINE

## 2024-10-16 PROCEDURE — 83036 HEMOGLOBIN GLYCOSYLATED A1C: CPT

## 2024-10-16 PROCEDURE — 80053 COMPREHEN METABOLIC PANEL: CPT

## 2024-10-16 PROCEDURE — 3078F DIAST BP <80 MM HG: CPT | Performed by: INTERNAL MEDICINE

## 2024-10-16 PROCEDURE — 3074F SYST BP LT 130 MM HG: CPT | Performed by: INTERNAL MEDICINE

## 2024-10-16 PROCEDURE — 85027 COMPLETE CBC AUTOMATED: CPT

## 2024-10-16 PROCEDURE — 99396 PREV VISIT EST AGE 40-64: CPT | Performed by: INTERNAL MEDICINE

## 2024-10-16 PROCEDURE — 1036F TOBACCO NON-USER: CPT | Performed by: INTERNAL MEDICINE

## 2024-10-16 PROCEDURE — 84443 ASSAY THYROID STIM HORMONE: CPT

## 2024-10-16 PROCEDURE — 3008F BODY MASS INDEX DOCD: CPT | Performed by: INTERNAL MEDICINE

## 2024-10-16 PROCEDURE — 81001 URINALYSIS AUTO W/SCOPE: CPT

## 2024-10-16 PROCEDURE — 36415 COLL VENOUS BLD VENIPUNCTURE: CPT

## 2024-10-16 PROCEDURE — 90471 IMMUNIZATION ADMIN: CPT | Performed by: INTERNAL MEDICINE

## 2024-10-16 PROCEDURE — 82607 VITAMIN B-12: CPT

## 2024-10-16 RX ORDER — MELOXICAM 15 MG/1
15 TABLET ORAL DAILY
Qty: 90 TABLET | Refills: 3 | Status: SHIPPED | OUTPATIENT
Start: 2024-10-16

## 2024-10-16 ASSESSMENT — ENCOUNTER SYMPTOMS
RHINORRHEA: 0
STRIDOR: 0
FACIAL ASYMMETRY: 0
POLYPHAGIA: 0
ABDOMINAL DISTENTION: 0
POLYDIPSIA: 0
TREMORS: 0
SEIZURES: 0
NECK PAIN: 0
RECTAL PAIN: 0
HYPERACTIVE: 0
EYE ITCHING: 0
CHILLS: 0
JOINT SWELLING: 0
NECK STIFFNESS: 0
DECREASED CONCENTRATION: 0
EYE REDNESS: 0
SHORTNESS OF BREATH: 0
BLOOD IN STOOL: 0
SORE THROAT: 0
EYE DISCHARGE: 0
SLEEP DISTURBANCE: 0
SPEECH DIFFICULTY: 0
COLOR CHANGE: 0
DIARRHEA: 0
ABDOMINAL PAIN: 0
TROUBLE SWALLOWING: 0
WHEEZING: 0
MYALGIAS: 0
BRUISES/BLEEDS EASILY: 0
LIGHT-HEADEDNESS: 0
FREQUENCY: 0
DIZZINESS: 0
FATIGUE: 0
ADENOPATHY: 0
FEVER: 0
CONSTIPATION: 0
CHEST TIGHTNESS: 0
VOMITING: 0
HEADACHES: 0
UNEXPECTED WEIGHT CHANGE: 0
SINUS PRESSURE: 0
DYSPHORIC MOOD: 0
APPETITE CHANGE: 0
CONFUSION: 0
DIAPHORESIS: 0
EYE PAIN: 0
ANAL BLEEDING: 0
PALPITATIONS: 0
WEAKNESS: 0
NAUSEA: 0
VOICE CHANGE: 0
ARTHRALGIAS: 0
CHOKING: 0
FACIAL SWELLING: 0
FLANK PAIN: 0
PHOTOPHOBIA: 0
AGITATION: 0
DIFFICULTY URINATING: 0
APNEA: 0
WOUND: 0
SINUS PAIN: 0
NERVOUS/ANXIOUS: 0
BACK PAIN: 0
ACTIVITY CHANGE: 0
HEMATURIA: 0
DYSURIA: 0
COUGH: 0
HALLUCINATIONS: 0
NUMBNESS: 1

## 2024-10-16 ASSESSMENT — PATIENT HEALTH QUESTIONNAIRE - PHQ9
SUM OF ALL RESPONSES TO PHQ9 QUESTIONS 1 AND 2: 0
2. FEELING DOWN, DEPRESSED OR HOPELESS: NOT AT ALL
1. LITTLE INTEREST OR PLEASURE IN DOING THINGS: NOT AT ALL

## 2024-10-16 NOTE — PROGRESS NOTES
"Subjective   Patient ID: Grabiel Cisneros \"Kyler\" is a 59 y.o. male who presents for Annual Exam.    HPI  Pt here for physical.  He is up in weight 8 lbs since last year.  Diet is not great-he was exercising more but then got Covid in late 9/2024.      He had colonoscopy in 2023 and he had several polyps.  He will return in 3 years.  No bowel issues or GI issues.      He sees derm yearly for full skin check.  He did have a few precancerous skin lesions taken off this past summer.      He had PSA done in 8/2024 that was <4.  No urinary symptoms.      Review of Systems   Constitutional:  Negative for activity change, appetite change, chills, diaphoresis, fatigue, fever and unexpected weight change.   HENT:  Positive for hearing loss and tinnitus. Negative for congestion, dental problem, drooling, ear discharge, ear pain, facial swelling, mouth sores, nosebleeds, postnasal drip, rhinorrhea, sinus pressure, sinus pain, sneezing, sore throat, trouble swallowing and voice change.    Eyes:  Negative for photophobia, pain, discharge, redness, itching and visual disturbance.   Respiratory:  Negative for apnea, cough, choking, chest tightness, shortness of breath, wheezing and stridor.    Cardiovascular:  Negative for chest pain, palpitations and leg swelling.   Gastrointestinal:  Negative for abdominal distention, abdominal pain, anal bleeding, blood in stool, constipation, diarrhea, nausea, rectal pain and vomiting.   Endocrine: Negative for cold intolerance, heat intolerance, polydipsia, polyphagia and polyuria.   Genitourinary:  Negative for decreased urine volume, difficulty urinating, dysuria, enuresis, flank pain, frequency, genital sores, hematuria, penile discharge, penile pain, penile swelling, scrotal swelling, testicular pain and urgency.   Musculoskeletal:  Negative for arthralgias, back pain, gait problem, joint swelling, myalgias, neck pain and neck stiffness.   Skin:  Negative for color change, pallor, rash and " "wound.   Allergic/Immunologic: Negative for environmental allergies, food allergies and immunocompromised state.   Neurological:  Positive for numbness (feet/toes). Negative for dizziness, tremors, seizures, syncope, facial asymmetry, speech difficulty, weakness, light-headedness and headaches.   Hematological:  Negative for adenopathy. Does not bruise/bleed easily.   Psychiatric/Behavioral:  Negative for agitation, behavioral problems, confusion, decreased concentration, dysphoric mood, hallucinations, self-injury, sleep disturbance and suicidal ideas. The patient is not nervous/anxious and is not hyperactive.        Objective   /70 (BP Location: Right arm, Patient Position: Sitting)   Pulse 70   Temp 36.9 °C (98.4 °F)   Resp 16   Ht 1.835 m (6' 0.25\")   Wt 102 kg (225 lb 9.6 oz)   SpO2 98%   BMI 30.39 kg/m²      Physical Exam  Constitutional:       Appearance: Normal appearance. He is obese.   HENT:      Head: Normocephalic and atraumatic.      Right Ear: Tympanic membrane, ear canal and external ear normal. There is no impacted cerumen.      Left Ear: Tympanic membrane, ear canal and external ear normal. There is no impacted cerumen.      Nose: Nose normal. No congestion or rhinorrhea.      Mouth/Throat:      Mouth: Mucous membranes are moist.      Pharynx: Oropharynx is clear. No oropharyngeal exudate or posterior oropharyngeal erythema.   Eyes:      Extraocular Movements: Extraocular movements intact.      Conjunctiva/sclera: Conjunctivae normal.      Pupils: Pupils are equal, round, and reactive to light.   Neck:      Vascular: No carotid bruit.   Cardiovascular:      Rate and Rhythm: Normal rate and regular rhythm.      Pulses: Normal pulses.      Heart sounds: Normal heart sounds. No murmur heard.  Pulmonary:      Effort: Pulmonary effort is normal. No respiratory distress.      Breath sounds: Normal breath sounds. No wheezing, rhonchi or rales.   Abdominal:      General: Abdomen is flat. Bowel " sounds are normal. There is no distension.      Palpations: Abdomen is soft.      Tenderness: There is no abdominal tenderness.      Hernia: No hernia is present.   Musculoskeletal:         General: No swelling or tenderness. Normal range of motion.      Cervical back: Normal range of motion and neck supple.      Right lower leg: No edema.      Left lower leg: No edema.   Lymphadenopathy:      Cervical: No cervical adenopathy.   Skin:     General: Skin is warm and dry.      Findings: No lesion or rash.   Neurological:      General: No focal deficit present.      Mental Status: He is alert and oriented to person, place, and time.      Cranial Nerves: No cranial nerve deficit.      Sensory: No sensory deficit.      Motor: No weakness.   Psychiatric:         Mood and Affect: Mood normal.         Behavior: Behavior normal.         Thought Content: Thought content normal.         Judgment: Judgment normal.         Assessment/Plan   Problem List Items Addressed This Visit       Lumbosacral neuritis    Relevant Medications    meloxicam (Mobic) 15 mg tablet    Mixed hyperlipidemia     On statin therapy  Repeat lipids today          Relevant Orders    Lipid Panel    Peripheral neuropathy    Relevant Orders    Vitamin B12    Elevated PSA     Improved in August and is now under 4  Did see urology          Controlled type 2 diabetes mellitus without complication, without long-term current use of insulin (Multi)     A1c was <6% but weight is up  Will repeat today  Advised to lessen sweats/carbs          Relevant Orders    Hemoglobin A1C    Urinalysis with Reflex Microscopic    Pulmonary emphysema, unspecified emphysema type (Multi)     No current issues  Not on inhalers  Was a smoker in the past/worked in factory          Class 1 obesity due to excess calories with serious comorbidity and body mass index (BMI) of 30.0 to 30.9 in adult     Pt up in weight  Admits to poor diet-eating more sweats then norm  He will work to lessen  this and get back to the gym           Other Visit Diagnoses       Routine general medical examination at a health care facility    -  Primary    Relevant Orders    Comprehensive Metabolic Panel    CBC    Follow Up In Primary Care - Health Maintenance    Former tobacco use        Relevant Orders    CT lung screening low dose    Screening for thyroid disorder        Relevant Orders    TSH with reflex to Free T4 if abnormal    Need for influenza vaccination        Relevant Orders    Flu vaccine, trivalent, preservative free, age 6 months and greater (Fluarix/Fluzone/Flulaval) (Completed)

## 2024-10-16 NOTE — PATIENT INSTRUCTIONS
Get your blood work and urine done today and we will call with results  Continue meds as directed-we did refill Meloxicam   You got your flu shot today; since you just had Covid you do not need a vaccine  Work hard on weight loss-lean protein/fish/veggies and less carbs/less sugars  Exercise with goal of 150 minutes/week   Have Ct lung screening-call and schedule  Follow up here in 1 year

## 2024-10-16 NOTE — ASSESSMENT & PLAN NOTE
Pt up in weight  Admits to poor diet-eating more sweats then norm  He will work to lessen this and get back to the gym

## 2024-11-20 DIAGNOSIS — E78.2 MIXED HYPERLIPIDEMIA: ICD-10-CM

## 2024-11-20 RX ORDER — ATORVASTATIN CALCIUM 20 MG/1
20 TABLET, FILM COATED ORAL DAILY
Qty: 90 TABLET | Refills: 3 | Status: SHIPPED | OUTPATIENT
Start: 2024-11-20

## 2025-04-09 ENCOUNTER — TELEPHONE (OUTPATIENT)
Dept: RADIOLOGY | Facility: HOSPITAL | Age: 60
End: 2025-04-09
Payer: COMMERCIAL

## 2025-08-23 LAB — PSA SERPL-MCNC: 3.89 NG/ML

## 2025-08-29 ENCOUNTER — APPOINTMENT (OUTPATIENT)
Dept: UROLOGY | Facility: CLINIC | Age: 60
End: 2025-08-29
Payer: COMMERCIAL

## 2025-08-29 VITALS — SYSTOLIC BLOOD PRESSURE: 125 MMHG | HEART RATE: 76 BPM | DIASTOLIC BLOOD PRESSURE: 71 MMHG

## 2025-08-29 DIAGNOSIS — R97.20 ELEVATED PSA: ICD-10-CM

## 2025-08-29 PROCEDURE — 3074F SYST BP LT 130 MM HG: CPT | Performed by: STUDENT IN AN ORGANIZED HEALTH CARE EDUCATION/TRAINING PROGRAM

## 2025-08-29 PROCEDURE — 99213 OFFICE O/P EST LOW 20 MIN: CPT | Performed by: STUDENT IN AN ORGANIZED HEALTH CARE EDUCATION/TRAINING PROGRAM

## 2025-08-29 PROCEDURE — 3078F DIAST BP <80 MM HG: CPT | Performed by: STUDENT IN AN ORGANIZED HEALTH CARE EDUCATION/TRAINING PROGRAM

## 2025-08-29 RX ORDER — CLOBETASOL PROPIONATE 0.5 MG/G
CREAM TOPICAL
COMMUNITY
Start: 2025-08-26

## 2025-08-29 RX ORDER — TADALAFIL 20 MG/1
20 TABLET ORAL AS NEEDED
Qty: 12 TABLET | Refills: 11 | Status: SHIPPED | OUTPATIENT
Start: 2025-08-29 | End: 2025-09-28

## 2025-10-20 ENCOUNTER — APPOINTMENT (OUTPATIENT)
Dept: PRIMARY CARE | Facility: CLINIC | Age: 60
End: 2025-10-20
Payer: COMMERCIAL

## 2025-10-21 ENCOUNTER — APPOINTMENT (OUTPATIENT)
Dept: PRIMARY CARE | Facility: CLINIC | Age: 60
End: 2025-10-21
Payer: COMMERCIAL

## 2026-09-04 ENCOUNTER — APPOINTMENT (OUTPATIENT)
Age: 61
End: 2026-09-04
Payer: COMMERCIAL